# Patient Record
Sex: FEMALE | Race: WHITE | NOT HISPANIC OR LATINO | URBAN - METROPOLITAN AREA
[De-identification: names, ages, dates, MRNs, and addresses within clinical notes are randomized per-mention and may not be internally consistent; named-entity substitution may affect disease eponyms.]

---

## 2023-11-29 ENCOUNTER — OFFICE VISIT (OUTPATIENT)
Dept: PRIMARY CARE | Facility: CLINIC | Age: 58
End: 2023-11-29
Payer: COMMERCIAL

## 2023-11-29 VITALS
WEIGHT: 172 LBS | OXYGEN SATURATION: 99 % | DIASTOLIC BLOOD PRESSURE: 72 MMHG | HEART RATE: 65 BPM | SYSTOLIC BLOOD PRESSURE: 118 MMHG | BODY MASS INDEX: 31.65 KG/M2 | HEIGHT: 62 IN | TEMPERATURE: 98 F

## 2023-11-29 DIAGNOSIS — F41.9 ANXIETY: Primary | ICD-10-CM

## 2023-11-29 DIAGNOSIS — E03.9 HYPOTHYROIDISM, UNSPECIFIED TYPE: ICD-10-CM

## 2023-11-29 PROCEDURE — 99214 OFFICE O/P EST MOD 30 MIN: CPT | Performed by: FAMILY MEDICINE

## 2023-11-29 PROCEDURE — 1036F TOBACCO NON-USER: CPT | Performed by: FAMILY MEDICINE

## 2023-11-29 RX ORDER — BUSPIRONE HYDROCHLORIDE 7.5 MG/1
7.5 TABLET ORAL 3 TIMES DAILY PRN
COMMUNITY
Start: 2023-08-26

## 2023-11-29 RX ORDER — DULOXETIN HYDROCHLORIDE 20 MG/1
20 CAPSULE, DELAYED RELEASE ORAL DAILY
COMMUNITY
Start: 2023-07-28 | End: 2023-11-29 | Stop reason: WASHOUT

## 2023-11-29 RX ORDER — SUMATRIPTAN SUCCINATE 100 MG/1
100 TABLET ORAL AS NEEDED
COMMUNITY
Start: 2023-11-07

## 2023-11-29 RX ORDER — ONDANSETRON HYDROCHLORIDE 8 MG/1
8 TABLET, FILM COATED ORAL 3 TIMES DAILY PRN
COMMUNITY
Start: 2023-07-28 | End: 2023-11-29 | Stop reason: WASHOUT

## 2023-11-29 RX ORDER — LEVOTHYROXINE SODIUM 50 UG/1
50 TABLET ORAL
Qty: 90 TABLET | Refills: 1 | Status: SHIPPED | OUTPATIENT
Start: 2023-11-29 | End: 2024-05-24

## 2023-11-29 RX ORDER — ESCITALOPRAM OXALATE 10 MG/1
10 TABLET ORAL DAILY
COMMUNITY
Start: 2023-11-07 | End: 2023-11-29 | Stop reason: SDUPTHER

## 2023-11-29 RX ORDER — LEVOTHYROXINE SODIUM 50 UG/1
50 TABLET ORAL
COMMUNITY
Start: 2015-04-02 | End: 2023-11-29 | Stop reason: SDUPTHER

## 2023-11-29 RX ORDER — ESCITALOPRAM OXALATE 10 MG/1
10 TABLET ORAL DAILY
Qty: 90 TABLET | Refills: 1 | Status: SHIPPED | OUTPATIENT
Start: 2023-11-29 | End: 2024-05-29 | Stop reason: SDUPTHER

## 2023-11-29 RX ORDER — CONJUGATED ESTROGENS 0.62 MG/G
CREAM VAGINAL
COMMUNITY

## 2023-11-29 ASSESSMENT — PATIENT HEALTH QUESTIONNAIRE - PHQ9
1. LITTLE INTEREST OR PLEASURE IN DOING THINGS: NOT AT ALL
2. FEELING DOWN, DEPRESSED OR HOPELESS: NOT AT ALL
SUM OF ALL RESPONSES TO PHQ9 QUESTIONS 1 AND 2: 0

## 2023-11-29 ASSESSMENT — PAIN SCALES - GENERAL: PAINLEVEL: 0-NO PAIN

## 2023-11-29 NOTE — PROGRESS NOTES
"    /72 (BP Location: Right arm, Patient Position: Sitting)   Pulse 65   Temp 36.7 °C (98 °F)   Ht 1.575 m (5' 2\")   Wt 78 kg (172 lb)   SpO2 99%   BMI 31.46 kg/m²     History reviewed. No pertinent past medical history.    There is no problem list on file for this patient.      Current Outpatient Medications   Medication Sig Dispense Refill    busPIRone (Buspar) 7.5 mg tablet Take 1 tablet (7.5 mg) by mouth 3 times a day as needed.      escitalopram (Lexapro) 10 mg tablet Take 1 tablet (10 mg) by mouth once daily.      levothyroxine (Synthroid, Levoxyl) 50 mcg tablet 1 tablet (50 mcg) once daily in the morning. Take before meals.      Premarin vaginal cream 0.5 G VAGINALLY TWICE WEEKLY USING APPLICATOR      SUMAtriptan (Imitrex) 100 mg tablet Take 1 tablet (100 mg) by mouth if needed for migraine. take 1 tablet for migraine relief, may repeat 2 hours later. Maximum 200 mg/day       No current facility-administered medications for this visit.       CC/HPI/ASSESSMENT/PLAN    CC follow medication    HPI patient suffers from hypothyroidism and anxiety disorder.  Patient notes she doing quite well with Lexapro 10 mg daily.  Request refill.  She had blood work earlier in the year and her thyroid levels good.  She request refill for her thyroid medication as well.  She notes she had 1 migraine in the last couple months and Imitrex worked well.  She has not used BuSpar noting that Lexapro has been working very well.  She denies fever chills chest pain.  ROS negative septa noted above past medical social surgical history is reviewed    Exam calm neck supple lungs CTA CV R number G psych calm pleasant female no psychosis    A/P 1.  Anxiety disorder chronic stable 2 hypothyroidism chronic stable.  Medicines refilled.  Follow-up 6 months.    There are no diagnoses linked to this encounter.   "

## 2024-05-24 PROBLEM — I34.1 MVP (MITRAL VALVE PROLAPSE): Status: ACTIVE | Noted: 2024-05-24

## 2024-05-24 PROBLEM — E55.9 VITAMIN D INSUFFICIENCY: Status: ACTIVE | Noted: 2024-05-24

## 2024-05-24 PROBLEM — M79.89 RIGHT LEG SWELLING: Status: ACTIVE | Noted: 2024-05-24

## 2024-05-24 PROBLEM — J45.909 ASTHMA (HHS-HCC): Status: ACTIVE | Noted: 2024-05-24

## 2024-05-24 PROBLEM — E53.8 VITAMIN B12 DEFICIENCY: Status: ACTIVE | Noted: 2024-05-24

## 2024-05-24 PROBLEM — J30.9 ALLERGIC RHINITIS: Status: ACTIVE | Noted: 2024-05-24

## 2024-05-24 PROBLEM — G43.009 COMMON MIGRAINE WITHOUT AURA: Status: ACTIVE | Noted: 2024-05-24

## 2024-05-24 PROBLEM — M54.50 LOW BACK PAIN: Status: ACTIVE | Noted: 2024-05-24

## 2024-05-24 PROBLEM — R55 SYNCOPE: Status: ACTIVE | Noted: 2023-08-17

## 2024-05-24 RX ORDER — ESTRADIOL 0.1 MG/G
CREAM VAGINAL
COMMUNITY
Start: 2024-05-21

## 2024-05-24 RX ORDER — NEOMYCIN SULFATE, POLYMYXIN B SULFATE, AND DEXAMETHASONE 3.5; 10000; 1 MG/G; [USP'U]/G; MG/G
OINTMENT OPHTHALMIC
COMMUNITY
Start: 2024-01-23

## 2024-05-29 ENCOUNTER — OFFICE VISIT (OUTPATIENT)
Dept: PRIMARY CARE | Facility: CLINIC | Age: 59
End: 2024-05-29
Payer: COMMERCIAL

## 2024-05-29 VITALS
HEART RATE: 88 BPM | HEIGHT: 62 IN | WEIGHT: 176 LBS | BODY MASS INDEX: 32.39 KG/M2 | SYSTOLIC BLOOD PRESSURE: 110 MMHG | DIASTOLIC BLOOD PRESSURE: 72 MMHG

## 2024-05-29 DIAGNOSIS — G43.009 MIGRAINE WITHOUT AURA AND WITHOUT STATUS MIGRAINOSUS, NOT INTRACTABLE: ICD-10-CM

## 2024-05-29 DIAGNOSIS — F41.9 ANXIETY: Primary | ICD-10-CM

## 2024-05-29 DIAGNOSIS — Z13.220 LIPID SCREENING: ICD-10-CM

## 2024-05-29 DIAGNOSIS — E55.9 VITAMIN D DEFICIENCY: ICD-10-CM

## 2024-05-29 DIAGNOSIS — E55.9 VITAMIN D INSUFFICIENCY: ICD-10-CM

## 2024-05-29 DIAGNOSIS — E03.9 HYPOTHYROIDISM, UNSPECIFIED TYPE: ICD-10-CM

## 2024-05-29 DIAGNOSIS — E53.8 VITAMIN B12 DEFICIENCY: ICD-10-CM

## 2024-05-29 PROCEDURE — 99214 OFFICE O/P EST MOD 30 MIN: CPT | Performed by: FAMILY MEDICINE

## 2024-05-29 PROCEDURE — 1036F TOBACCO NON-USER: CPT | Performed by: FAMILY MEDICINE

## 2024-05-29 RX ORDER — LEVOTHYROXINE SODIUM 50 UG/1
50 TABLET ORAL
Qty: 90 TABLET | Refills: 1 | Status: SHIPPED | OUTPATIENT
Start: 2024-05-29 | End: 2025-05-29

## 2024-05-29 RX ORDER — ESCITALOPRAM OXALATE 10 MG/1
10 TABLET ORAL DAILY
Qty: 90 TABLET | Refills: 1 | Status: SHIPPED | OUTPATIENT
Start: 2024-05-29 | End: 2025-05-29

## 2024-05-29 ASSESSMENT — PATIENT HEALTH QUESTIONNAIRE - PHQ9: 2. FEELING DOWN, DEPRESSED OR HOPELESS: NOT AT ALL

## 2024-05-29 NOTE — PROGRESS NOTES
"    /72   Pulse 88   Ht 1.575 m (5' 2\")   Wt 79.8 kg (176 lb)   BMI 32.19 kg/m²     No past medical history on file.    Patient Active Problem List   Diagnosis    Hypothyroidism    Anxiety    Allergic rhinitis    Asthma ([facility])    Common migraine without aura    Low back pain    MVP (mitral valve prolapse)    Right leg swelling    Syncope    Vitamin B12 deficiency    Vitamin D insufficiency       Current Outpatient Medications   Medication Sig Dispense Refill    busPIRone (Buspar) 7.5 mg tablet Take 1 tablet (7.5 mg) by mouth 3 times a day as needed.      escitalopram (Lexapro) 10 mg tablet Take 1 tablet (10 mg) by mouth once daily. 90 tablet 1    estradiol (Estrace) 0.01 % (0.1 mg/gram) vaginal cream       levothyroxine (Synthroid, Levoxyl) 50 mcg tablet TAKE 1 TABLET (50 MCG) BY MOUTH ONCE DAILY IN THE MORNING. TAKE BEFORE MEALS. 30 tablet 0    SUMAtriptan (Imitrex) 100 mg tablet Take 1 tablet (100 mg) by mouth if needed for migraine. take 1 tablet for migraine relief, may repeat 2 hours later. Maximum 200 mg/day      neomycin-polymyxin B-dexameth (Polydex) 3.5 mg/g-10,000 unit/g-0.1 % ointment ophthalmic ointment APPLY A SMALL AMOUNT ON EYELID FOUR TIMES A DAY      Premarin vaginal cream 0.5 G VAGINALLY TWICE WEEKLY USING APPLICATOR       No current facility-administered medications for this visit.       CC/HPI/ASSESSMENT/PLAN    CC fall medication    HPI patient with a history of anxiety disorder and hypothyroidism.  Patient currently on Lexapro 10 mg.  She notes she is doing very well with medication.  She has BuSpar to use as needed.  Patient notes she has not needed BuSpar in many months.  Suffers from migraine headaches noting she has only had 2 migraines in the last year.  She retired last year.  She remains on levothyroxine 50 mcg daily dose for her hypothyroidism.  She will need blood work.  Denies headache fever chest pain palpitation short of breath abdominal pain diarrhea blood in urine or " stool.  ROS negative some note above past medical/history reviewed    Exam: Vital stable eyes no jaundice neck supple no carotid bruit lungs CTA good air exchange CV RRR no murmur abdomen obese Ext no edema skin no rash neuro alert and oriented no focal neurologic deficits noted.  Psych calm pleasant female  Pulses    A/P 1.  Anxiety disorder chronic stable Lexapro refilled.  #2 hypothyroidism chronic stable levothyroxine refilled.  3, migraine without aura stable.  Medicines refilled.  Blood work ordered.  Follow-up 6 months    There are no diagnoses linked to this encounter.

## 2024-06-12 LAB
NON-UH HIE A/G RATIO: 1.1
NON-UH HIE ALB: 3.6 G/DL (ref 3.4–5)
NON-UH HIE ALK PHOS: 85 UNIT/L (ref 45–117)
NON-UH HIE BILIRUBIN, TOTAL: 0.5 MG/DL (ref 0.3–1.2)
NON-UH HIE BUN/CREAT RATIO: 23.8
NON-UH HIE BUN: 19 MG/DL (ref 9–23)
NON-UH HIE CALCIUM: 8.8 MG/DL (ref 8.7–10.4)
NON-UH HIE CALCULATED LDL CHOLESTEROL: 97 MG/DL (ref 60–130)
NON-UH HIE CALCULATED OSMOLALITY: 279 MOSM/KG (ref 275–295)
NON-UH HIE CHLORIDE: 109 MMOL/L (ref 98–107)
NON-UH HIE CHOLESTEROL: 165 MG/DL (ref 100–200)
NON-UH HIE CO2, VENOUS: 25 MMOL/L (ref 20–31)
NON-UH HIE CREATININE: 0.8 MG/DL (ref 0.5–0.8)
NON-UH HIE GFR AA: >60
NON-UH HIE GLOBULIN: 3.2 G/DL
NON-UH HIE GLOMERULAR FILTRATION RATE: >60 ML/MIN/1.73M?
NON-UH HIE GLUCOSE: 87 MG/DL (ref 74–106)
NON-UH HIE GOT: 18 UNIT/L (ref 15–37)
NON-UH HIE GPT: 10 UNIT/L (ref 10–49)
NON-UH HIE HDL CHOLESTEROL: 54 MG/DL (ref 40–60)
NON-UH HIE K: 3.9 MMOL/L (ref 3.5–5.1)
NON-UH HIE NA: 139 MMOL/L (ref 135–145)
NON-UH HIE TOTAL CHOL/HDL CHOL RATIO: 3.1
NON-UH HIE TOTAL PROTEIN: 6.8 G/DL (ref 5.7–8.2)
NON-UH HIE TRIGLYCERIDES: 72 MG/DL (ref 30–150)
NON-UH HIE TSH: 1.15 UIU/ML (ref 0.55–4.78)
NON-UH HIE VIT D 25: 35 NG/ML
NON-UH HIE VITAMIN B12: 363 PG/ML (ref 211–911)

## 2024-06-13 ENCOUNTER — TELEPHONE (OUTPATIENT)
Dept: PRIMARY CARE | Facility: CLINIC | Age: 59
End: 2024-06-13
Payer: COMMERCIAL

## 2024-06-13 NOTE — RESULT ENCOUNTER NOTE
Blood work reviewed.  Vitamin D and vitamin B12 levels in the low normal range, recommend adding 1000 IU vitamin D3 and 1000 mcg B12 daily over-the-counter.  Cholesterol liver kidney sugar thyroid levels look good

## 2024-06-13 NOTE — TELEPHONE ENCOUNTER
----- Message from Ethan Crocker MD sent at 6/13/2024  1:23 PM EDT -----  Blood work reviewed.  Vitamin D and vitamin B12 levels in the low normal range, recommend adding 1000 IU vitamin D3 and 1000 mcg B12 daily over-the-counter.  Cholesterol liver kidney sugar thyroid levels look good

## 2024-08-01 DIAGNOSIS — Z12.31 ENCOUNTER FOR SCREENING MAMMOGRAM FOR BREAST CANCER: ICD-10-CM

## 2024-12-10 RX ORDER — DOXYCYCLINE 100 MG/1
CAPSULE ORAL
COMMUNITY
Start: 2024-12-03

## 2024-12-10 RX ORDER — TOBRAMYCIN 3 MG/ML
SOLUTION/ DROPS OPHTHALMIC
COMMUNITY
Start: 2023-07-25 | End: 2024-12-11 | Stop reason: WASHOUT

## 2024-12-11 ENCOUNTER — APPOINTMENT (OUTPATIENT)
Dept: PRIMARY CARE | Facility: CLINIC | Age: 59
End: 2024-12-11
Payer: COMMERCIAL

## 2024-12-11 VITALS
DIASTOLIC BLOOD PRESSURE: 78 MMHG | BODY MASS INDEX: 32.79 KG/M2 | HEIGHT: 62 IN | SYSTOLIC BLOOD PRESSURE: 112 MMHG | WEIGHT: 178.2 LBS | HEART RATE: 72 BPM | TEMPERATURE: 98 F

## 2024-12-11 DIAGNOSIS — E03.9 HYPOTHYROIDISM, UNSPECIFIED TYPE: ICD-10-CM

## 2024-12-11 DIAGNOSIS — G43.009 MIGRAINE WITHOUT AURA AND WITHOUT STATUS MIGRAINOSUS, NOT INTRACTABLE: Primary | ICD-10-CM

## 2024-12-11 DIAGNOSIS — F41.9 ANXIETY: ICD-10-CM

## 2024-12-11 PROCEDURE — 99214 OFFICE O/P EST MOD 30 MIN: CPT | Performed by: FAMILY MEDICINE

## 2024-12-11 PROCEDURE — 3008F BODY MASS INDEX DOCD: CPT | Performed by: FAMILY MEDICINE

## 2024-12-11 RX ORDER — LEVOTHYROXINE SODIUM 50 UG/1
50 TABLET ORAL
Qty: 90 TABLET | Refills: 1 | Status: SHIPPED | OUTPATIENT
Start: 2024-12-11 | End: 2025-06-09

## 2024-12-11 RX ORDER — ESCITALOPRAM OXALATE 10 MG/1
10 TABLET ORAL DAILY
Qty: 90 TABLET | Refills: 1 | Status: SHIPPED | OUTPATIENT
Start: 2024-12-11 | End: 2025-12-11

## 2024-12-11 RX ORDER — SUMATRIPTAN SUCCINATE 100 MG/1
100 TABLET ORAL AS NEEDED
Qty: 9 TABLET | Refills: 3 | Status: SHIPPED | OUTPATIENT
Start: 2024-12-11

## 2024-12-11 RX ORDER — AMOXICILLIN AND CLAVULANATE POTASSIUM 875; 125 MG/1; MG/1
1 TABLET, FILM COATED ORAL
COMMUNITY
Start: 2024-12-03 | End: 2024-12-11 | Stop reason: WASHOUT

## 2024-12-11 NOTE — PROGRESS NOTES
"    /78   Pulse 72   Temp 36.7 °C (98 °F)   Ht 1.575 m (5' 2\")   Wt 80.8 kg (178 lb 3.2 oz)   BMI 32.59 kg/m²     No past medical history on file.    Patient Active Problem List   Diagnosis    Hypothyroidism    Anxiety    Allergic rhinitis    Asthma    Common migraine without aura    Low back pain    MVP (mitral valve prolapse)    Right leg swelling    Syncope    Vitamin B12 deficiency    Vitamin D insufficiency       Current Outpatient Medications   Medication Sig Dispense Refill    busPIRone (Buspar) 7.5 mg tablet Take 1 tablet (7.5 mg) by mouth 3 times a day as needed.      doxycycline (Vibramycin) 100 mg capsule       escitalopram (Lexapro) 10 mg tablet TAKE 1 TABLET BY MOUTH EVERY DAY 30 tablet 0    estradiol (Estrace) 0.01 % (0.1 mg/gram) vaginal cream       levothyroxine (Synthroid, Levoxyl) 50 mcg tablet TAKE 1 TABLET (50 MCG) BY MOUTH ONCE DAILY IN THE MORNING. TAKE BEFORE MEALS. 90 tablet 0    Premarin vaginal cream 0.5 G VAGINALLY TWICE WEEKLY USING APPLICATOR      SUMAtriptan (Imitrex) 100 mg tablet Take 1 tablet (100 mg) by mouth if needed for migraine. take 1 tablet for migraine relief, may repeat 2 hours later. Maximum 200 mg/day      amoxicillin-pot clavulanate (Augmentin) 875-125 mg tablet Take 1 tablet by mouth every 12 hours. (Patient not taking: Reported on 12/11/2024)      neomycin-polymyxin B-dexameth (Polydex) 3.5 mg/g-10,000 unit/g-0.1 % ointment ophthalmic ointment APPLY A SMALL AMOUNT ON EYELID FOUR TIMES A DAY (Patient not taking: Reported on 12/11/2024)      tobramycin (Tobrex) 0.3 % ophthalmic solution Administer into affected eye(s). (Patient not taking: Reported on 12/11/2024)       No current facility-administered medications for this visit.       CC/HPI/ASSESSMENT/PLAN    CC follow-up medication    HPI patient with a history of migraine headaches hypothyroidism anxiety disorder.  Patient request refills of medication.  Patient notes she is doing well.  Denies fever chills " chest pain palpitation short of breath.  Notes Imitrex works well for her migraines.  She notes her mood is very stable with the Lexapro medication requesting refill.  Blood work performed in the summer showed stable thyroid levels with good kidney liver cholesterol sugar levels.  ROS negative except noted above past medical social surgical history is reviewed    Exam calm neuro alert and oriented no focal neurologic deficit noted no cognitive deficit noted.  Psych calm pleasant female no psychosis.  Lungs CTA CV RRR skin no rash    A/P 1 migraine headaches chronic stable.  2 hypothyroidism chronic stable.  Medications are refilled.  #3 anxiety disorder chronic stable medicines refilled.  Follow-up 6 months.  Will repeat blood work at next visit    There are no diagnoses linked to this encounter.

## 2025-05-25 DIAGNOSIS — F41.9 ANXIETY: ICD-10-CM

## 2025-05-28 RX ORDER — ESCITALOPRAM OXALATE 10 MG/1
10 TABLET ORAL DAILY
Qty: 90 TABLET | Refills: 1 | OUTPATIENT
Start: 2025-05-28

## 2025-05-29 ENCOUNTER — LAB (OUTPATIENT)
Dept: LAB | Facility: HOSPITAL | Age: 60
End: 2025-05-29
Payer: COMMERCIAL

## 2025-05-30 LAB
25(OH)D3+25(OH)D2 SERPL-MCNC: 40 NG/ML (ref 30–100)
ALBUMIN SERPL-MCNC: 3.9 G/DL (ref 3.6–5.1)
ALP SERPL-CCNC: 74 U/L (ref 37–153)
ALT SERPL-CCNC: 13 U/L (ref 6–29)
ANION GAP SERPL CALCULATED.4IONS-SCNC: 7 MMOL/L (CALC) (ref 7–17)
AST SERPL-CCNC: 19 U/L (ref 10–35)
BILIRUB SERPL-MCNC: 0.5 MG/DL (ref 0.2–1.2)
BUN SERPL-MCNC: 19 MG/DL (ref 7–25)
CALCIUM SERPL-MCNC: 8.9 MG/DL (ref 8.6–10.4)
CHLORIDE SERPL-SCNC: 107 MMOL/L (ref 98–110)
CHOLEST SERPL-MCNC: 184 MG/DL
CHOLEST/HDLC SERPL: 3 (CALC)
CO2 SERPL-SCNC: 23 MMOL/L (ref 20–32)
CREAT SERPL-MCNC: 0.67 MG/DL (ref 0.5–1.05)
EGFRCR SERPLBLD CKD-EPI 2021: 100 ML/MIN/1.73M2
GLUCOSE SERPL-MCNC: 93 MG/DL (ref 65–99)
HDLC SERPL-MCNC: 62 MG/DL
LDLC SERPL CALC-MCNC: 101 MG/DL (CALC)
NONHDLC SERPL-MCNC: 122 MG/DL (CALC)
POTASSIUM SERPL-SCNC: 4.1 MMOL/L (ref 3.5–5.3)
PROT SERPL-MCNC: 6.3 G/DL (ref 6.1–8.1)
SODIUM SERPL-SCNC: 137 MMOL/L (ref 135–146)
TRIGL SERPL-MCNC: 111 MG/DL
TSH SERPL-ACNC: 1.58 MIU/L (ref 0.4–4.5)
VIT B12 SERPL-MCNC: 324 PG/ML (ref 200–1100)

## 2025-06-06 ENCOUNTER — TELEPHONE (OUTPATIENT)
Dept: PRIMARY CARE | Facility: CLINIC | Age: 60
End: 2025-06-06
Payer: COMMERCIAL

## 2025-06-06 DIAGNOSIS — F41.9 ANXIETY: ICD-10-CM

## 2025-06-06 RX ORDER — ESCITALOPRAM OXALATE 10 MG/1
10 TABLET ORAL DAILY
Qty: 90 TABLET | Refills: 0 | Status: SHIPPED | OUTPATIENT
Start: 2025-06-06 | End: 2026-06-06

## 2025-07-14 ENCOUNTER — APPOINTMENT (OUTPATIENT)
Dept: PRIMARY CARE | Facility: CLINIC | Age: 60
End: 2025-07-14
Payer: COMMERCIAL

## 2025-07-14 VITALS
DIASTOLIC BLOOD PRESSURE: 80 MMHG | WEIGHT: 185 LBS | BODY MASS INDEX: 34.04 KG/M2 | HEIGHT: 62 IN | SYSTOLIC BLOOD PRESSURE: 110 MMHG | TEMPERATURE: 97.8 F | HEART RATE: 69 BPM

## 2025-07-14 DIAGNOSIS — F41.9 ANXIETY: ICD-10-CM

## 2025-07-14 DIAGNOSIS — E03.9 HYPOTHYROIDISM, UNSPECIFIED TYPE: Primary | ICD-10-CM

## 2025-07-14 PROCEDURE — 3008F BODY MASS INDEX DOCD: CPT | Performed by: FAMILY MEDICINE

## 2025-07-14 PROCEDURE — 99214 OFFICE O/P EST MOD 30 MIN: CPT | Performed by: FAMILY MEDICINE

## 2025-07-14 RX ORDER — LEVOTHYROXINE SODIUM 50 UG/1
50 TABLET ORAL
Qty: 90 TABLET | Refills: 1 | Status: SHIPPED | OUTPATIENT
Start: 2025-07-14 | End: 2025-10-12

## 2025-07-14 RX ORDER — ESCITALOPRAM OXALATE 10 MG/1
10 TABLET ORAL DAILY
Qty: 90 TABLET | Refills: 1 | Status: SHIPPED | OUTPATIENT
Start: 2025-07-14 | End: 2026-07-14

## 2025-07-14 SDOH — ECONOMIC STABILITY: FOOD INSECURITY: WITHIN THE PAST 12 MONTHS, THE FOOD YOU BOUGHT JUST DIDN'T LAST AND YOU DIDN'T HAVE MONEY TO GET MORE.: NEVER TRUE

## 2025-07-14 SDOH — ECONOMIC STABILITY: FOOD INSECURITY: WITHIN THE PAST 12 MONTHS, YOU WORRIED THAT YOUR FOOD WOULD RUN OUT BEFORE YOU GOT MONEY TO BUY MORE.: NEVER TRUE

## 2025-07-14 ASSESSMENT — COLUMBIA-SUICIDE SEVERITY RATING SCALE - C-SSRS
1. IN THE PAST MONTH, HAVE YOU WISHED YOU WERE DEAD OR WISHED YOU COULD GO TO SLEEP AND NOT WAKE UP?: NO
6. HAVE YOU EVER DONE ANYTHING, STARTED TO DO ANYTHING, OR PREPARED TO DO ANYTHING TO END YOUR LIFE?: NO
2. HAVE YOU ACTUALLY HAD ANY THOUGHTS OF KILLING YOURSELF?: NO

## 2025-07-14 ASSESSMENT — LIFESTYLE VARIABLES
HOW MANY STANDARD DRINKS CONTAINING ALCOHOL DO YOU HAVE ON A TYPICAL DAY: 1 OR 2
HOW OFTEN DO YOU HAVE A DRINK CONTAINING ALCOHOL: 2-4 TIMES A MONTH
HOW OFTEN DURING THE LAST YEAR HAVE YOU FAILED TO DO WHAT WAS NORMALLY EXPECTED FROM YOU BECAUSE OF DRINKING: NEVER
HOW OFTEN DURING THE LAST YEAR HAVE YOU HAD A FEELING OF GUILT OR REMORSE AFTER DRINKING: NEVER
AUDIT TOTAL SCORE: 2
HOW OFTEN DO YOU HAVE SIX OR MORE DRINKS ON ONE OCCASION: NEVER
HAS A RELATIVE, FRIEND, DOCTOR, OR ANOTHER HEALTH PROFESSIONAL EXPRESSED CONCERN ABOUT YOUR DRINKING OR SUGGESTED YOU CUT DOWN: NO
AUDIT-C TOTAL SCORE: 2
HOW OFTEN DURING THE LAST YEAR HAVE YOU BEEN UNABLE TO REMEMBER WHAT HAPPENED THE NIGHT BEFORE BECAUSE YOU HAD BEEN DRINKING: NEVER
SKIP TO QUESTIONS 9-10: 1
HAVE YOU OR SOMEONE ELSE BEEN INJURED AS A RESULT OF YOUR DRINKING: NO
HOW OFTEN DURING THE LAST YEAR HAVE YOU NEEDED AN ALCOHOLIC DRINK FIRST THING IN THE MORNING TO GET YOURSELF GOING AFTER A NIGHT OF HEAVY DRINKING: NEVER
HOW OFTEN DURING THE LAST YEAR HAVE YOU FOUND THAT YOU WERE NOT ABLE TO STOP DRINKING ONCE YOU HAD STARTED: NEVER

## 2025-07-14 ASSESSMENT — PATIENT HEALTH QUESTIONNAIRE - PHQ9
2. FEELING DOWN, DEPRESSED OR HOPELESS: NOT AT ALL
1. LITTLE INTEREST OR PLEASURE IN DOING THINGS: NOT AT ALL
SUM OF ALL RESPONSES TO PHQ9 QUESTIONS 1 AND 2: 0

## 2025-07-14 ASSESSMENT — PAIN SCALES - GENERAL: PAINLEVEL_OUTOF10: 0-NO PAIN

## 2025-07-14 NOTE — PROGRESS NOTES
"    /80   Pulse 69   Temp 36.6 °C (97.8 °F)   Ht 1.575 m (5' 2\")   Wt 83.9 kg (185 lb)   BMI 33.84 kg/m²     Medical History[1]    Problem List[2]    Current Medications[3]    CC/HPI/ASSESSMENT/PLAN    CC follow-up medication    HPI patient history of hypothyroidism and anxiety disorder.  Patient request refills.  She notes overall feeling well denies fever chills chest pain palpitation short of breath.  Blood work for recently reviewed.  Cholesterol liver kidney sugar thyroid levels look good.  We discussed dietary changes and decreasing starch intake to allow for weight loss.  ROS negative except noted above.  Past medical/surgical history reviewed    Exam: Vital stable eyes no jaundice neck supple lungs CTA CV RRR.  Psych calm pleasant female no psychosis    A/P 1.  Hypothyroidism chronic stable 2 anxiety chronic stable.  Medicines are refilled.  Blood work was reviewed as noted above.  Dietary changes discussed.  Follow-up 6 months    There are no diagnoses linked to this encounter.          [1] History reviewed. No pertinent past medical history.  [2]   Patient Active Problem List  Diagnosis    Hypothyroidism    Anxiety    Allergic rhinitis    Asthma    Common migraine without aura    Low back pain    MVP (mitral valve prolapse)    Right leg swelling    Syncope    Vitamin B12 deficiency    Vitamin D insufficiency   [3]   Current Outpatient Medications   Medication Sig Dispense Refill    busPIRone (Buspar) 7.5 mg tablet Take 1 tablet (7.5 mg) by mouth 3 times a day as needed.      escitalopram (Lexapro) 10 mg tablet Take 1 tablet (10 mg) by mouth once daily. 90 tablet 0    estradiol (Estrace) 0.01 % (0.1 mg/gram) vaginal cream       levothyroxine (Synthroid, Levoxyl) 50 mcg tablet TAKE 1 TABLET (50 MCG) BY MOUTH ONCE DAILY IN THE MORNING. TAKE BEFORE MEALS. 90 tablet 0    SUMAtriptan (Imitrex) 100 mg tablet Take 1 tablet (100 mg) by mouth if needed for migraine. take 1 tablet for migraine relief, may " repeat 2 hours later. Maximum 200 mg/day 9 tablet 3    doxycycline (Vibramycin) 100 mg capsule  (Patient not taking: Reported on 7/14/2025)      Premarin vaginal cream 0.5 G VAGINALLY TWICE WEEKLY USING APPLICATOR (Patient not taking: Reported on 7/14/2025)       No current facility-administered medications for this visit.

## 2025-08-21 LAB — NON-UH HIE GP HPV: NEGATIVE

## 2025-08-22 LAB — NON-UH HIE THINPREP TIS PAP: NORMAL
